# Patient Record
(demographics unavailable — no encounter records)

---

## 2024-11-13 NOTE — REVIEW OF SYSTEMS
[FreeTextEntry2] : per HPI [Dizziness] : dizziness [Negative] : Heme/Lymph [Discharge] : no discharge [Pain] : no pain [Redness] : no redness [Dryness] : no dryness  [Itching] : no itching [Headache] : no headache [Fainting] : no fainting [Confusion] : no confusion [Memory Loss] : no memory loss [Unsteady Walking] : no ataxia [FreeTextEntry3] : blurry vision [de-identified] : poor balance

## 2024-11-13 NOTE — HISTORY OF PRESENT ILLNESS
[Home] : at home, [unfilled] , at the time of the visit. [Medical Office: ([facility])___] : at the medical office located in  [Verbal consent obtained from patient] : the patient, [unfilled] [FreeTextEntry1] : follow up [de-identified] : 55 yo F pmh anxiety, congenital hand deformity, tobacco abuse, hx of thyroid nodule presents for follow up Of note, Sees pain management Dr Negar Trujillo for cervical spine ablations. Patient states when she is leaning back in a sitting position, she has noticed blurry vision. She has also lately noticed poor balance, dizziness. Due to this, was seen by neuro Dr Christelle Alvarenga. She had a carotid doppler and cranial doppler which was negative. Her blood pressure was noted to be elevated in the office ~SBP 150s. She was then seen by ophtho. She was also advised to have her routine bloodwork checked. She is scheduled to have CT head with contrast next week.

## 2024-11-13 NOTE — HISTORY OF PRESENT ILLNESS
[Home] : at home, [unfilled] , at the time of the visit. [Medical Office: ([facility])___] : at the medical office located in  [Verbal consent obtained from patient] : the patient, [unfilled] [FreeTextEntry1] : follow up [de-identified] : 57 yo F pmh anxiety, congenital hand deformity, tobacco abuse, hx of thyroid nodule presents for follow up Of note, Sees pain management Dr Negar Trujillo for cervical spine ablations. Patient states when she is leaning back in a sitting position, she has noticed blurry vision. She has also lately noticed poor balance, dizziness. Due to this, was seen by neuro Dr Christelle Alvarenga. She had a carotid doppler and cranial doppler which was negative. Her blood pressure was noted to be elevated in the office ~SBP 150s. She was then seen by ophtho. She was also advised to have her routine bloodwork checked. She is scheduled to have CT head with contrast next week.

## 2024-11-13 NOTE — HISTORY OF PRESENT ILLNESS
[Home] : at home, [unfilled] , at the time of the visit. [Medical Office: ([facility])___] : at the medical office located in  [Verbal consent obtained from patient] : the patient, [unfilled] [FreeTextEntry1] : follow up [de-identified] : 55 yo F pmh anxiety, congenital hand deformity, tobacco abuse, hx of thyroid nodule presents for follow up Of note, Sees pain management Dr Negar Trujillo for cervical spine ablations. Patient states when she is leaning back in a sitting position, she has noticed blurry vision. She has also lately noticed poor balance, dizziness. Due to this, was seen by neuro Dr Christelle Alvarenga. She had a carotid doppler and cranial doppler which was negative. Her blood pressure was noted to be elevated in the office ~SBP 150s. She was then seen by ophtho. She was also advised to have her routine bloodwork checked. She is scheduled to have CT head with contrast next week.

## 2024-11-13 NOTE — REVIEW OF SYSTEMS
[FreeTextEntry2] : per HPI [Dizziness] : dizziness [Negative] : Heme/Lymph [Discharge] : no discharge [Pain] : no pain [Redness] : no redness [Dryness] : no dryness  [Itching] : no itching [Headache] : no headache [Fainting] : no fainting [Confusion] : no confusion [Memory Loss] : no memory loss [Unsteady Walking] : no ataxia [FreeTextEntry3] : blurry vision [de-identified] : poor balance

## 2024-11-13 NOTE — PLAN
[FreeTextEntry1] : Dizziness, blurry vision, poor balance - pending CT head next week from neuro - seen by ophtho and neuro. Advised to have routine bloodwork performed - routine labs ordered. patient will be going to [facility]  Anxiety/depression - continue escitalopram 10 mg QD and xanax prn - continue sessions with therapist - psychiatry referral given  HTN - continue amlodipine 2.5 mg daily - advised to log BP at home. - advised on dietary changes, increasing exercise, avoiding excess salt  Thyroid nodule - endocrine follow up. Last sono July 2024  HLD - f/u lipid panel - continue atorvastatin 20 mg QD - Advised on lifestyle modifications such as increasing exercise, cardio at least 150 mins per week and dietary changes.  Pulm nodules - Ct chest performed June 2023

## 2024-11-13 NOTE — REVIEW OF SYSTEMS
[FreeTextEntry2] : per HPI [Dizziness] : dizziness [Negative] : Heme/Lymph [Discharge] : no discharge [Pain] : no pain [Redness] : no redness [Dryness] : no dryness  [Itching] : no itching [Headache] : no headache [Fainting] : no fainting [Confusion] : no confusion [Memory Loss] : no memory loss [Unsteady Walking] : no ataxia [FreeTextEntry3] : blurry vision [de-identified] : poor balance

## 2024-11-26 NOTE — REVIEW OF SYSTEMS
[Headache] : no headache [Dizziness] : dizziness [Fainting] : no fainting [Negative] : Musculoskeletal

## 2024-11-26 NOTE — PLAN
[FreeTextEntry1] : Dizziness, HTN - advised to try taking blood pressure medication with food mid day and see if dizziness improved.  - Advised to eat 3 meals daily - will have neurology notes and testing reports sent to the office - f/u in office for BP check

## 2024-11-26 NOTE — HISTORY OF PRESENT ILLNESS
[Home] : at home, [unfilled] , at the time of the visit. [Medical Office: ([facility])___] : at the medical office located in  [Verbal consent obtained from patient] : the patient, [unfilled] [FreeTextEntry1] : follow up [de-identified] : 56 yo F pmh anxiety, congenital hand deformity, tobacco abuse, hx of thyroid nodule presents for follow up Has been seeing neurology for dizziness. Was started on losartan 25 mg QD.  She has been taking medication daily.  Reports episodes of loss of balance --> during one episode, fell and hurt right shoulder. Seen at [facility] MD, however was unable to have shoulder xray but was told likely shoulder strain.  She states she has been taking blood pressure medication in the morning on an empty stomach. Admits to skipping meals. reports adequate hydration.

## 2025-01-17 NOTE — HISTORY OF PRESENT ILLNESS
[FreeTextEntry1] : CPE [de-identified] : 58 yo F pmh anxiety, congenital hand deformity, tobacco abuse, hx of thyroid nodule presents for CPE Was seen by neurologist for dizziness and prescribed losartan 25 mg QD for HTN. Reports improvement in dizziness with taking BP medication midday.  Seen by endocrine for thyroid nodule. Repeat thyroid sono due Aug 2025 Admits to smoking 1/2-1 ppd cigarettes Reports stress regarding mother's dementia. 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

## 2025-01-17 NOTE — HISTORY OF PRESENT ILLNESS
[FreeTextEntry1] : CPE [de-identified] : 58 yo F pmh anxiety, congenital hand deformity, tobacco abuse, hx of thyroid nodule presents for CPE Was seen by neurologist for dizziness and prescribed losartan 25 mg QD for HTN. Reports improvement in dizziness with taking BP medication midday.  Seen by endocrine for thyroid nodule. Repeat thyroid sono due Aug 2025 Admits to smoking 1/2-1 ppd cigarettes Reports stress regarding mother's dementia. 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

## 2025-01-17 NOTE — PLAN
[FreeTextEntry1] : Health Care Maintenance - routine labs lab today -- performed in office  - EKG similar to prior - Colonoscopy May 2022, repeat 3 years Dr Connor -- will be calling to schedule  - Mammo July 2024  - Pap 2022 Dr Zabala - depression screen negative - recommend annual skin cancer screening with Dermatologist - recommended annual eye exam with Ophthalmologist - recommended annual dental exam - h/o anxiety, congenital hand deformity, tobacco abuse, hx of thyroid nodule - continue lifestyle modifications - CPE in 1 year or sooner visit as needed  Anxiety/depression - continue escitalopram 10 mg QD and xanax prn - currently looking for new therapist - psychiatry referral given  HTN - Blood pressure above goal - will increase losartan to 50 mg QD  - advised to log BP at home. - advised on dietary changes, increasing exercise, avoiding excess salt  Tobacco abuse, Pulm nodules - extensively counseled on adverse effects of smoking tobacco/vaping such as malignancies like lung cancer. Patient reports he is ready to quit smoking. Will work on tapering tobacco use. Declines smoking cessation medication - CT chest July 2024  Thyroid nodule - repeat thyroid sono due Aug 2025 - endocrine follow up  HLD - continue atorvastatin 20 mg QD - Advised on lifestyle modifications such as increasing exercise, cardio at least 150 mins per week and dietary changes.

## 2025-01-17 NOTE — HEALTH RISK ASSESSMENT
[Yes] : Yes [Monthly or less (1 pt)] : Monthly or less (1 point) [1 or 2 (0 pts)] : 1 or 2 (0 points) [Never (0 pts)] : Never (0 points) [No] : In the past 12 months have you used drugs other than those required for medical reasons? No [0] : 2) Feeling down, depressed, or hopeless: Not at all (0) [PHQ-2 Negative - No further assessment needed] : PHQ-2 Negative - No further assessment needed [Current] : Current [20 or more] : 20 or more [NO] : No [HIV test declined] : HIV test declined [Hepatitis C test declined] : Hepatitis C test declined [None] : None [Alone] : lives alone [Unemployed] : unemployed [Significant Other] : lives with significant other [Feels Safe at Home] : Feels safe at home [Fully functional (bathing, dressing, toileting, transferring, walking, feeding)] : Fully functional (bathing, dressing, toileting, transferring, walking, feeding) [Fully functional (using the telephone, shopping, preparing meals, housekeeping, doing laundry, using] : Fully functional and needs no help or supervision to perform IADLs (using the telephone, shopping, preparing meals, housekeeping, doing laundry, using transportation, managing medications and managing finances) [Smoke Detector] : smoke detector [Carbon Monoxide Detector] : carbon monoxide detector [Audit-CScore] : 1 [[id number]] : 0 [Change in mental status noted] : No change in mental status noted [Language] : denies difficulty with language [Handling Complex Tasks] : denies difficulty handling complex tasks [Reports changes in hearing] : Reports no changes in hearing [Reports changes in vision] : Reports no changes in vision [Reports changes in dental health] : Reports no changes in dental health [MammogramDate] : 07/24 [PapSmearDate] : 01/22 [PapSmearComments] : Dr Zabala [ColonoscopyDate] : 05/22 [FreeTextEntry2] : previously [place]

## 2025-01-17 NOTE — HEALTH RISK ASSESSMENT
[Yes] : Yes [Monthly or less (1 pt)] : Monthly or less (1 point) [1 or 2 (0 pts)] : 1 or 2 (0 points) [Never (0 pts)] : Never (0 points) [No] : In the past 12 months have you used drugs other than those required for medical reasons? No [0] : 2) Feeling down, depressed, or hopeless: Not at all (0) [PHQ-2 Negative - No further assessment needed] : PHQ-2 Negative - No further assessment needed [Current] : Current [20 or more] : 20 or more [NO] : No [HIV test declined] : HIV test declined [Hepatitis C test declined] : Hepatitis C test declined [None] : None [Alone] : lives alone [Unemployed] : unemployed [Significant Other] : lives with significant other [Feels Safe at Home] : Feels safe at home [Fully functional (bathing, dressing, toileting, transferring, walking, feeding)] : Fully functional (bathing, dressing, toileting, transferring, walking, feeding) [Fully functional (using the telephone, shopping, preparing meals, housekeeping, doing laundry, using] : Fully functional and needs no help or supervision to perform IADLs (using the telephone, shopping, preparing meals, housekeeping, doing laundry, using transportation, managing medications and managing finances) [Smoke Detector] : smoke detector [Carbon Monoxide Detector] : carbon monoxide detector [Audit-CScore] : 1 [[id number]] : 0 [Change in mental status noted] : No change in mental status noted [Language] : denies difficulty with language [Handling Complex Tasks] : denies difficulty handling complex tasks [Reports changes in hearing] : Reports no changes in hearing [Reports changes in vision] : Reports no changes in vision [Reports changes in dental health] : Reports no changes in dental health [MammogramDate] : 07/24 [PapSmearDate] : 01/22 [PapSmearComments] : Dr Zabala [ColonoscopyDate] : 05/22 [FreeTextEntry2] : previously [place]

## 2025-04-30 NOTE — HISTORY OF PRESENT ILLNESS
[Home] : at home, [unfilled] , at the time of the visit. [Other Location: e.g. Home (Enter Location, City,State)___] : at [unfilled] [Telehealth (audio & video)] : This visit was provided via telehealth using real-time 2-way audio visual technology. [Verbal consent obtained from patient] : the patient, [unfilled] [FreeTextEntry1] : follow up [de-identified] : 59 yo F pmh anxiety, congenital hand deformity, tobacco abuse, hx of thyroid nodule presents for follow up Reports noting cyst of upper right inner thigh region, near groin. Started about 2 weeks ago Is not growing in size. Is not painful, however discomfort when she wears tight clothing or underwear. Size is about 1 in x 1 in Patient states cyst is slight red and slightly warm to touch, however she denies fevers, chills, muscle aches.  She reports having similar cyst of right thigh a few years prior that needed to be drained by surgery

## 2025-04-30 NOTE — HISTORY OF PRESENT ILLNESS
[Home] : at home, [unfilled] , at the time of the visit. [Other Location: e.g. Home (Enter Location, City,State)___] : at [unfilled] [Telehealth (audio & video)] : This visit was provided via telehealth using real-time 2-way audio visual technology. [Verbal consent obtained from patient] : the patient, [unfilled] [FreeTextEntry1] : follow up [de-identified] : 59 yo F pmh anxiety, congenital hand deformity, tobacco abuse, hx of thyroid nodule presents for follow up Reports noting cyst of upper right inner thigh region, near groin. Started about 2 weeks ago Is not growing in size. Is not painful, however discomfort when she wears tight clothing or underwear. Size is about 1 in x 1 in Patient states cyst is slight red and slightly warm to touch, however she denies fevers, chills, muscle aches.  She reports having similar cyst of right thigh a few years prior that needed to be drained by surgery

## 2025-04-30 NOTE — PHYSICAL EXAM
[No Acute Distress] : no acute distress [Speech Grossly Normal] : speech grossly normal [Alert and Oriented x3] : oriented to person, place, and time [de-identified] : 1 in x 1 in erythematous, cyst in right groin region

## 2025-04-30 NOTE — PLAN
[FreeTextEntry1] : Cyst of groin  - patient will be following up with surgery. Patient will be calling today to schedule appointment with surgery - will hold on antibiotics pending surgery eval. Advised on warm compresses to the region in the interim - advised to avoid tight clothing or undergarments.  - Advised if worsening pain, fevers, redness, drainage, to call back office or got to ER for evaluation

## 2025-04-30 NOTE — PHYSICAL EXAM
[No Acute Distress] : no acute distress [Speech Grossly Normal] : speech grossly normal [Alert and Oriented x3] : oriented to person, place, and time [de-identified] : 1 in x 1 in erythematous, cyst in right groin region

## 2025-06-11 NOTE — PHYSICAL EXAM
[FreeTextEntry3] : serpiginous white scale on lateral feet  punctum visible on right groin   scattered brown macules on trunk and extremities  yellowish papules on bl cheeks

## 2025-06-11 NOTE — HISTORY OF PRESENT ILLNESS
[de-identified] : 58 yr old female presents for cyst on right groin that has been waxing and waning. She has undergone surgery in past for cyst in groin adjacent to current lesion. Today, there is no bump and she would like to discuss options. Also requesting FBSE. No personal or family history of skin cancer.  Patient has tried OTC antifungal medications.

## 2025-06-11 NOTE — ASSESSMENT
[FreeTextEntry1] : #cyst, right groin chronic stable bc of ill defined nature today and location, would recommend ILK injections in future for flares RTC when flaring  #multiple nevi  #Seborrheic keratoses 4. Lentigines 5. Cherry angioma Full body exam today. No concerning lesions for melanoma or NMSC clinically or under dermoscopy on todays exam. Benign findings as above. - Patient educated on ABCDEs of melanoma screening  - Recommend self skin exam monthly, annual exam by MD - Photoprotection reviewed including sun-protective behaviors, protective clothing, and the use of OTC broad-spectrum SPF 30+ sunscreens was advised  - RTC if develops lesions that are new, symptomatic (bleeding, itching), changing in size/color/shape  # Tinea pedis Chronic; flaring - Diagnosis and treatment options discussed - Start ketoconazole 2% cream BID, apply to feet, interwebs and under nails until symptoms resolve - Recommend over-the-counter antifungal spray or powder for your shoes 1-2 times a week. Alternatively, you may purchase UVC lamp online ([employer]) to get rid of fungus in your shoes - Keep feet dry, rotate shoes, cotton socks, clean shower/tub with bleach    #milia face chronic flaing recommended extraction during cosmetic appointment on a Monday

## 2025-07-15 NOTE — PLAN
[FreeTextEntry1] : Sebaceous cyst  - GYN follow up  Anxiety/depression - continue escitalopram 10 mg QD and prn xanax (last filled June 12, 2025) - currently looking for new therapist - psychiatry referral given  HTN - continue losartan 50 mg QD - advised to log BP at home. - advised on dietary changes, increasing exercise, avoiding excess salt  Tobacco abuse, Pulm nodules - extensively counseled on adverse effects of smoking tobacco/vaping such as malignancies like lung cancer. Patient reports he is ready to quit smoking. Will work on tapering tobacco use. Declines smoking cessation medication - CT chest July 2024, repeat ordered  Thyroid nodule - repeat thyroid sono due Aug 2025 - endocrine follow up  HLD - f/u lipid panel and CMP - continue atorvastatin 20 mg QD - Advised on lifestyle modifications such as increasing exercise, cardio at least 150 mins per week and dietary changes.

## 2025-07-15 NOTE — HISTORY OF PRESENT ILLNESS
[Home] : at home, [unfilled] , at the time of the visit. [Other Location: e.g. Home (Enter Location, City,State)___] : at [unfilled] [Telehealth (audio & video)] : This visit was provided via telehealth using real-time 2-way audio visual technology. [Verbal consent obtained from patient] : the patient, [unfilled] [FreeTextEntry1] : follow up [de-identified] : 57 yo F pmh anxiety, congenital hand deformity, tobacco abuse, hx of thyroid nodule presents for follow up Seen by derm and now on terbinafine for fungal infection She was also seen by GYN and diagnosed with sebaceous cysts of clitoris and labia. Was told no treatment needed. Patient states it is not painful.  Patient reports normal BP readings at home with losartan 50 mg QD